# Patient Record
Sex: FEMALE | URBAN - METROPOLITAN AREA
[De-identification: names, ages, dates, MRNs, and addresses within clinical notes are randomized per-mention and may not be internally consistent; named-entity substitution may affect disease eponyms.]

---

## 2021-01-04 LAB
HCT VFR BLD AUTO: 40.5 % (ref 37–47)
HGB BLD-MCNC: 13.1 GM/DL (ref 12–16)

## 2021-01-07 ENCOUNTER — HISTORICAL (OUTPATIENT)
Dept: SURGERY | Facility: HOSPITAL | Age: 36
End: 2021-01-07

## 2021-01-07 LAB — POC BETA-HCG (QUAL): NEGATIVE

## 2022-04-28 NOTE — OP NOTE
Patient:   Helena Norris             MRN: [id number]            FIN: [id number]               Age:   35 years     Sex:  Female     :  1985   Associated Diagnoses:   None   Author:   Kip Collier MD      Operative Note   Operative Information   Medications.     Preop diagnosis= IUD removal  Postop diagnosis= same  Procedure performed= operative hysteroscopy with removal of IUD  General anesthetic  No blood loss  Procedure in detail= the patient was brought to the operating room and placed on the operating table.  She underwent a general anesthetic induction without difficulty.  She was placed in lithotomy position with Jose stirrups.  The perineum and vagina were prepped and draped.  The bladder was drained with an in and out catheter.  The cervix was visualized with a weighted speculum and then dilated with Hegar dilators to #6.  The Kelly sure scope was used to inspect the endocervical canal and intrauterine cavity.  The IUD was identified in the endometrial cavity with the string sitting high in the endometrial cavity as well.  The IUD did not appear to be embedded.  Grasping forceps were placed through the operative scope and the IUD string was grasped.  The IUD was then extracted with the hysteroscope and was intact.  The grafts were taken to identify the findings.  There was no blood loss during the procedure.  After completing the procedure the patient was taken out of lithotomy position, awoke from the anesthetic and was transferred to the recovery room in stable condition

## 2022-04-28 NOTE — H&P
Patient:   Helena Norris             MRN: [id number]            FIN: [id number]               Age:   35 years     Sex:  Female     :  1985   Associated Diagnoses:   None   Author:   Kip Collier MD      Basic Information   Source of history:  Self.    Referral source:  Self.    History limitation:  Language barrier.       Chief Complaint   Pt is admitted for hysteroscopy with IUD removal      History of Present Illness   Pt was seen at the office for IUD removal, however the IUD string was not visible. Ultrasound was used to attempt removal however I was unable to capture the IUD. She is admitted today for hysteroscopy with IUD removal      Review of Systems   this patient is having no fever. She has normal bowel movements. She has normal urinary symptoms.      Health Status   Allergies:    Allergic Reactions (Selected)  No Known Medication Allergies,    Allergies (1) Active Reaction  No Known Medication Allergies None Documented     Current medications:  (Selected)   Inpatient Medications  Ordered  Ancef: 1 gm, form: Injection, IV, Pre Op, first dose 21 5:29:00 CST, stop date 21 5:29:00 CST  Lactated Ringers 1000ml 1,000 mL: 1,000 mL, 1,000 mL, IV, 20 mL/hr, start date 21 5:31:00 CST  Pending Complete  midazolam: 2 mg, form: Injection, IV Push, q5min, Order duration: 2 dose(s), first dose 21 5:35:00 CST, stop date 21 5:44:00 CST, (up to 5 mg for moderate anxiety)  Documented Medications  Documented  multivitamin with minerals (Adult Tab): 1 tab(s), Oral, Daily, # 30 tab(s), 0 Refill(s),    Medications (2) Active  Scheduled: (1)  cefazolin 1 gm Inj  1 gm, IV, Pre Op  Continuous: (1)  lactated ringers 1,000 mL  1,000 mL, IV, 20 mL/hr  PRN: (0)     Problem list:    All Problems  Complication of internal prosthetic device / SNOMED CT 104769090 / Confirmed  IUD,    Active Problems (1)  Complication of internal prosthetic device         Histories   Past Medical History:     No active or resolved past medical history items have been selected or recorded.   Family History:    Entire family history is negative.   Procedure history:    appendectomy., she also has a history of an abnormal pattern the past she had cryosurgery in 1991 of the cervix   Social History        Social & Psychosocial Habits    Alcohol    Comment: denies - 01/05/2021 12:57 - Nando RESENDIZ, Eliza MEDEROS    Tobacco  01/05/2021  Use: Never (less than 100 in l    Patient Wants Consult For Cessation Counseling N/A    01/07/2021  Use: Never (less than 100 in l    Patient Wants Consult For Cessation Counseling N/A    Abuse/Neglect  01/05/2021  SHX Any signs of abuse or neglect No    01/07/2021  SHX Any signs of abuse or neglect No    Spiritual/Cultural  01/05/2021  Tenriism Preference None  .        Physical Examination   Vital Signs   1/7/2021 5:49 CST        Temperature Oral          36.7 DegC                             Temperature Oral (calculated)             98.06 DegF                             Peripheral Pulse Rate     70 bpm                             Respiratory Rate          20 br/min                             SpO2                      97 %                             Systolic Blood Pressure   112 mmHg                             Diastolic Blood Pressure  74 mmHg                             Mean Arterial Pressure, Cuff              86 mmHg        Vital Signs (last 24 hrs)_____  Last Charted___________  Temp Oral     36.7 DegC  (JAN 07 05:49)  Heart Rate Peripheral   70 bpm  (JAN 07 05:49)  Resp Rate         20 br/min  (JAN 07 05:49)  SBP      112 mmHg  (JAN 07 05:49)  DBP      74 mmHg  (JAN 07 05:49)  SpO2      97 %  (JAN 07 05:49)  Weight      53 kg  (JAN 07 06:02)  Height      163 cm  (JAN 07 06:02)  BMI      19.95  (JAN 07 06:02)     Measurements from flowsheet : Measurements   1/7/2021 6:02 CST        Weight Dosing             53.3 kg                             Weight Measured           53 kg                              Weight Measured and Calculated in Lbs     117.51 lb                             Height/Length Dosing      163 cm                             Height/Length Measured    163 cm                             Body Mass Index Measured  19.95 kg/m2     the patient's head eyes ears nose and throatphysical exathin normal liits  heart showedrate and rhythm no murmurs or gallops  Lungs showed vesibreath sounds bilaterally with no wheezes or rales heard  abdomen wanormals softnondistended bowel sounds are positiveno masses palpated there is no tenderness  xternal genitala were normal  Vagina clear  Cervix closed  Uterus = normal size, shape, iud present  Ovaries=   normal  rectum=no masses felt  Extremities= no edema pulses equal bilaterallyno cyanosis defect      Health Maintenance      Health Maintenance     Pending (in the next year)        Due            Alcohol Misuse Screening due  01/02/21  and every 1  year(s)           ADL Screening due  01/07/21  and every 1  year(s)           Tetanus Vaccine due  01/07/21  and every 10  year(s)        Due In Future            Obesity Screening not due until  01/01/22  and every 1  year(s)     Satisfied (in the past 1 year)        Satisfied            Blood Pressure Screening on  01/07/21.  Satisfied by Jennifer Mcneill CNA           Body Mass Index Check on  01/07/21.  Satisfied by Chelsie Bryson RN.           Cervical Cancer Screening on  01/04/21.  Satisfied by Trixie Mcelroy           Obesity Screening on  01/07/21.  Satisfied by Chelsie Bryson RN.          Review / Management   Results review:     Labs (Last four charted values)  Hgb                  13.1 (JAN 04)   Hct                  40.5 (JAN 04) .       Impression and Plan     IUD present and admitted for IUD removal